# Patient Record
Sex: MALE | Race: WHITE | Employment: FULL TIME | URBAN - METROPOLITAN AREA
[De-identification: names, ages, dates, MRNs, and addresses within clinical notes are randomized per-mention and may not be internally consistent; named-entity substitution may affect disease eponyms.]

---

## 2019-06-12 ENCOUNTER — OFFICE VISIT (OUTPATIENT)
Dept: INTERNAL MEDICINE CLINIC | Facility: CLINIC | Age: 29
End: 2019-06-12
Payer: COMMERCIAL

## 2019-06-12 ENCOUNTER — HOSPITAL ENCOUNTER (OUTPATIENT)
Dept: GENERAL RADIOLOGY | Facility: HOSPITAL | Age: 29
Discharge: HOME | End: 2019-06-12
Attending: INTERNAL MEDICINE
Payer: COMMERCIAL

## 2019-06-12 VITALS
DIASTOLIC BLOOD PRESSURE: 81 MMHG | HEIGHT: 71 IN | HEART RATE: 78 BPM | BODY MASS INDEX: 31.36 KG/M2 | WEIGHT: 224 LBS | SYSTOLIC BLOOD PRESSURE: 138 MMHG | RESPIRATION RATE: 16 BRPM

## 2019-06-12 DIAGNOSIS — S99.911A INJURY OF RIGHT ANKLE, INITIAL ENCOUNTER: Primary | ICD-10-CM

## 2019-06-12 DIAGNOSIS — S99.911A INJURY OF RIGHT ANKLE, INITIAL ENCOUNTER: ICD-10-CM

## 2019-06-12 PROCEDURE — 99212 OFFICE O/P EST SF 10 MIN: CPT | Performed by: INTERNAL MEDICINE

## 2019-06-12 PROCEDURE — 73610 X-RAY EXAM OF ANKLE: CPT | Performed by: INTERNAL MEDICINE

## 2019-06-12 PROCEDURE — 99203 OFFICE O/P NEW LOW 30 MIN: CPT | Performed by: INTERNAL MEDICINE

## 2019-06-12 NOTE — PROGRESS NOTES
Kentrell Potter is a 25year old male. HPI:   1. Right ankle injury    3 weeks ago had baseball injury with slide into a base and twisted right ankle. Had swelling and bruising that developed the next day. Pain is better but swelling has persisted.  Pain Rest and ice joint and don't run until feeling better. The patient indicates understanding of these issues and agrees to the plan.   The patient is asked to return in 3 months

## 2019-06-26 ENCOUNTER — LAB ENCOUNTER (OUTPATIENT)
Dept: LAB | Age: 29
End: 2019-06-26
Attending: INTERNAL MEDICINE
Payer: COMMERCIAL

## 2019-06-26 ENCOUNTER — OFFICE VISIT (OUTPATIENT)
Dept: INTERNAL MEDICINE CLINIC | Facility: CLINIC | Age: 29
End: 2019-06-26
Payer: COMMERCIAL

## 2019-06-26 VITALS
RESPIRATION RATE: 16 BRPM | DIASTOLIC BLOOD PRESSURE: 81 MMHG | HEIGHT: 71 IN | BODY MASS INDEX: 30.94 KG/M2 | HEART RATE: 68 BPM | SYSTOLIC BLOOD PRESSURE: 134 MMHG | WEIGHT: 221 LBS

## 2019-06-26 DIAGNOSIS — Z00.00 PHYSICAL EXAM, ANNUAL: Primary | ICD-10-CM

## 2019-06-26 DIAGNOSIS — Z00.00 PHYSICAL EXAM, ANNUAL: ICD-10-CM

## 2019-06-26 PROCEDURE — 36415 COLL VENOUS BLD VENIPUNCTURE: CPT

## 2019-06-26 PROCEDURE — 99395 PREV VISIT EST AGE 18-39: CPT | Performed by: INTERNAL MEDICINE

## 2019-06-26 PROCEDURE — 80053 COMPREHEN METABOLIC PANEL: CPT

## 2019-06-26 PROCEDURE — 81003 URINALYSIS AUTO W/O SCOPE: CPT

## 2019-06-26 PROCEDURE — 84443 ASSAY THYROID STIM HORMONE: CPT

## 2019-06-26 PROCEDURE — 80061 LIPID PANEL: CPT

## 2019-06-26 PROCEDURE — 85025 COMPLETE CBC W/AUTO DIFF WBC: CPT

## 2019-06-26 NOTE — PROGRESS NOTES
Hannah Luna is a 25year old male who presents for a complete physical exam.   HPI:   Pt complains of nothing.       Immunization History  Administered            Date(s) Administered    DTP                   11/14/1988 02/06/1989 04/16/1989 exertion  CARDIOVASCULAR: denies chest pain on exertion  GI: denies abdominal pain,denies heartburn  : denies nocturia or changes in stream  MUSCULOSKELETAL: denies back pain  NEURO: denies headaches  PSYCHE: denies depression or anxiety  HEMATOLOGIC: de these issues and agrees to the plan.     The patient is asked to return for CPX in 1 year

## 2019-07-27 ENCOUNTER — HOSPITAL ENCOUNTER (OUTPATIENT)
Age: 29
Discharge: HOME | End: 2019-07-27
Payer: COMMERCIAL

## 2019-07-27 VITALS
WEIGHT: 220 LBS | DIASTOLIC BLOOD PRESSURE: 66 MMHG | RESPIRATION RATE: 20 BRPM | TEMPERATURE: 99 F | HEIGHT: 71 IN | SYSTOLIC BLOOD PRESSURE: 127 MMHG | OXYGEN SATURATION: 99 % | HEART RATE: 90 BPM | BODY MASS INDEX: 30.8 KG/M2

## 2019-07-27 DIAGNOSIS — L02.91 ABSCESS: Primary | ICD-10-CM

## 2019-07-27 PROCEDURE — 99204 OFFICE O/P NEW MOD 45 MIN: CPT

## 2019-07-27 PROCEDURE — 99213 OFFICE O/P EST LOW 20 MIN: CPT

## 2019-07-27 RX ORDER — CEPHALEXIN 500 MG/1
500 CAPSULE ORAL 4 TIMES DAILY
Qty: 40 CAPSULE | Refills: 0 | Status: ON HOLD | OUTPATIENT
Start: 2019-07-27 | End: 2019-08-01

## 2019-07-27 NOTE — ED PROVIDER NOTES
Patient presents with:  Swelling Edema (cardiovascular, metabolic)      HPI:     Paola Sage is a 25year old male with no significant past medical history presents with a chief complaint of swelling, pain and erythema to to medial aspect of the R thi primary care provider at scheduled appointment on Tuesday. No orders of the defined types were placed in this encounter. Labs performed this visit:  No results found for this or any previous visit (from the past 10 hour(s)).     Diagnosis:    ICD-10

## 2019-07-27 NOTE — ED INITIAL ASSESSMENT (HPI)
r upper thigh pain with swelling  noticed last week, denies fever,states he was playing softball, no direct trauma

## 2019-07-29 ENCOUNTER — OFFICE VISIT (OUTPATIENT)
Dept: INTERNAL MEDICINE CLINIC | Facility: CLINIC | Age: 29
End: 2019-07-29
Payer: COMMERCIAL

## 2019-07-29 ENCOUNTER — APPOINTMENT (OUTPATIENT)
Dept: CT IMAGING | Facility: HOSPITAL | Age: 29
DRG: 558 | End: 2019-07-29
Attending: EMERGENCY MEDICINE
Payer: COMMERCIAL

## 2019-07-29 ENCOUNTER — HOSPITAL ENCOUNTER (INPATIENT)
Facility: HOSPITAL | Age: 29
LOS: 2 days | Discharge: HOME | DRG: 558 | End: 2019-08-01
Attending: EMERGENCY MEDICINE | Admitting: HOSPITALIST
Payer: COMMERCIAL

## 2019-07-29 VITALS
TEMPERATURE: 99 F | HEIGHT: 71 IN | WEIGHT: 218 LBS | RESPIRATION RATE: 16 BRPM | SYSTOLIC BLOOD PRESSURE: 123 MMHG | DIASTOLIC BLOOD PRESSURE: 80 MMHG | HEART RATE: 94 BPM | BODY MASS INDEX: 30.52 KG/M2

## 2019-07-29 DIAGNOSIS — L03.115 CELLULITIS OF RIGHT THIGH: Primary | ICD-10-CM

## 2019-07-29 DIAGNOSIS — L02.415 ABSCESS OF RIGHT THIGH: Primary | ICD-10-CM

## 2019-07-29 DIAGNOSIS — L02.415 ABSCESS OF RIGHT THIGH: ICD-10-CM

## 2019-07-29 LAB
ANION GAP SERPL CALC-SCNC: 7 MMOL/L (ref 0–18)
BASOPHILS # BLD AUTO: 0.1 X10(3) UL (ref 0–0.2)
BASOPHILS NFR BLD AUTO: 0.6 %
BUN BLD-MCNC: 13 MG/DL (ref 7–18)
BUN/CREAT SERPL: 11.5 (ref 10–20)
CALCIUM BLD-MCNC: 9.9 MG/DL (ref 8.5–10.1)
CHLORIDE SERPL-SCNC: 99 MMOL/L (ref 98–112)
CO2 SERPL-SCNC: 29 MMOL/L (ref 21–32)
CREAT BLD-MCNC: 1.13 MG/DL (ref 0.7–1.3)
DEPRECATED RDW RBC AUTO: 35.6 FL (ref 35.1–46.3)
EOSINOPHIL # BLD AUTO: 0.26 X10(3) UL (ref 0–0.7)
EOSINOPHIL NFR BLD AUTO: 1.5 %
ERYTHROCYTE [DISTWIDTH] IN BLOOD BY AUTOMATED COUNT: 11.8 % (ref 11–15)
GLUCOSE BLD-MCNC: 87 MG/DL (ref 70–99)
HCT VFR BLD AUTO: 46 % (ref 39–53)
HGB BLD-MCNC: 15.6 G/DL (ref 13–17.5)
IMM GRANULOCYTES # BLD AUTO: 0.15 X10(3) UL (ref 0–1)
IMM GRANULOCYTES NFR BLD: 0.9 %
LACTATE SERPL-SCNC: 1.3 MMOL/L (ref 0.4–2)
LYMPHOCYTES # BLD AUTO: 2.64 X10(3) UL (ref 1–4)
LYMPHOCYTES NFR BLD AUTO: 15.5 %
MCH RBC QN AUTO: 28.6 PG (ref 26–34)
MCHC RBC AUTO-ENTMCNC: 33.9 G/DL (ref 31–37)
MCV RBC AUTO: 84.4 FL (ref 80–100)
MONOCYTES # BLD AUTO: 1.4 X10(3) UL (ref 0.1–1)
MONOCYTES NFR BLD AUTO: 8.2 %
NEUTROPHILS # BLD AUTO: 12.48 X10 (3) UL (ref 1.5–7.7)
NEUTROPHILS # BLD AUTO: 12.48 X10(3) UL (ref 1.5–7.7)
NEUTROPHILS NFR BLD AUTO: 73.3 %
OSMOLALITY SERPL CALC.SUM OF ELEC: 279 MOSM/KG (ref 275–295)
PLATELET # BLD AUTO: 429 10(3)UL (ref 150–450)
POTASSIUM SERPL-SCNC: 3.7 MMOL/L (ref 3.5–5.1)
RBC # BLD AUTO: 5.45 X10(6)UL (ref 4.3–5.7)
SODIUM SERPL-SCNC: 135 MMOL/L (ref 136–145)
WBC # BLD AUTO: 17 X10(3) UL (ref 4–11)

## 2019-07-29 PROCEDURE — 72193 CT PELVIS W/DYE: CPT | Performed by: EMERGENCY MEDICINE

## 2019-07-29 PROCEDURE — 99213 OFFICE O/P EST LOW 20 MIN: CPT | Performed by: INTERNAL MEDICINE

## 2019-07-29 PROCEDURE — 99222 1ST HOSP IP/OBS MODERATE 55: CPT | Performed by: HOSPITALIST

## 2019-07-29 PROCEDURE — 99212 OFFICE O/P EST SF 10 MIN: CPT | Performed by: INTERNAL MEDICINE

## 2019-07-29 PROCEDURE — 0Y9C0ZZ DRAINAGE OF RIGHT UPPER LEG, OPEN APPROACH: ICD-10-PCS | Performed by: EMERGENCY MEDICINE

## 2019-07-29 RX ORDER — LIDOCAINE HYDROCHLORIDE AND EPINEPHRINE 20; 5 MG/ML; UG/ML
20 INJECTION, SOLUTION EPIDURAL; INFILTRATION; INTRACAUDAL; PERINEURAL ONCE
Status: COMPLETED | OUTPATIENT
Start: 2019-07-29 | End: 2019-07-29

## 2019-07-29 RX ORDER — KETOROLAC TROMETHAMINE 30 MG/ML
30 INJECTION, SOLUTION INTRAMUSCULAR; INTRAVENOUS ONCE
Status: COMPLETED | OUTPATIENT
Start: 2019-07-29 | End: 2019-07-29

## 2019-07-29 NOTE — ED INITIAL ASSESSMENT (HPI)
Patient sent here by his doctor for an abscess on the right side of his groin. Was started on keflex Saturday and has not helped. Patient having fevers at home.

## 2019-07-30 PROBLEM — L02.415 ABSCESS OF RIGHT THIGH: Status: ACTIVE | Noted: 2019-07-30

## 2019-07-30 LAB
ALBUMIN SERPL-MCNC: 2.8 G/DL (ref 3.4–5)
ALBUMIN/GLOB SERPL: 0.7 {RATIO} (ref 1–2)
ALP LIVER SERPL-CCNC: 77 U/L (ref 45–117)
ALT SERPL-CCNC: 93 U/L (ref 16–61)
ANION GAP SERPL CALC-SCNC: 8 MMOL/L (ref 0–18)
AST SERPL-CCNC: 43 U/L (ref 15–37)
BASOPHILS # BLD AUTO: 0.08 X10(3) UL (ref 0–0.2)
BASOPHILS NFR BLD AUTO: 0.7 %
BILIRUB SERPL-MCNC: 0.5 MG/DL (ref 0.1–2)
BUN BLD-MCNC: 12 MG/DL (ref 7–18)
BUN/CREAT SERPL: 11.1 (ref 10–20)
CALCIUM BLD-MCNC: 8.3 MG/DL (ref 8.5–10.1)
CHLORIDE SERPL-SCNC: 109 MMOL/L (ref 98–112)
CO2 SERPL-SCNC: 25 MMOL/L (ref 21–32)
CREAT BLD-MCNC: 1.08 MG/DL (ref 0.7–1.3)
DEPRECATED RDW RBC AUTO: 36.2 FL (ref 35.1–46.3)
EOSINOPHIL # BLD AUTO: 0.26 X10(3) UL (ref 0–0.7)
EOSINOPHIL NFR BLD AUTO: 2.2 %
ERYTHROCYTE [DISTWIDTH] IN BLOOD BY AUTOMATED COUNT: 11.8 % (ref 11–15)
GLOBULIN PLAS-MCNC: 3.9 G/DL (ref 2.8–4.4)
GLUCOSE BLD-MCNC: 90 MG/DL (ref 70–99)
HCT VFR BLD AUTO: 38.4 % (ref 39–53)
HGB BLD-MCNC: 13 G/DL (ref 13–17.5)
IMM GRANULOCYTES # BLD AUTO: 0.14 X10(3) UL (ref 0–1)
IMM GRANULOCYTES NFR BLD: 1.2 %
LYMPHOCYTES # BLD AUTO: 2.6 X10(3) UL (ref 1–4)
LYMPHOCYTES NFR BLD AUTO: 21.8 %
M PROTEIN MFR SERPL ELPH: 6.7 G/DL (ref 6.4–8.2)
MCH RBC QN AUTO: 28.9 PG (ref 26–34)
MCHC RBC AUTO-ENTMCNC: 33.9 G/DL (ref 31–37)
MCV RBC AUTO: 85.3 FL (ref 80–100)
MONOCYTES # BLD AUTO: 1.35 X10(3) UL (ref 0.1–1)
MONOCYTES NFR BLD AUTO: 11.3 %
NEUTROPHILS # BLD AUTO: 7.47 X10 (3) UL (ref 1.5–7.7)
NEUTROPHILS # BLD AUTO: 7.47 X10(3) UL (ref 1.5–7.7)
NEUTROPHILS NFR BLD AUTO: 62.8 %
OSMOLALITY SERPL CALC.SUM OF ELEC: 293 MOSM/KG (ref 275–295)
PLATELET # BLD AUTO: 328 10(3)UL (ref 150–450)
POTASSIUM SERPL-SCNC: 4.4 MMOL/L (ref 3.5–5.1)
RBC # BLD AUTO: 4.5 X10(6)UL (ref 4.3–5.7)
SODIUM SERPL-SCNC: 142 MMOL/L (ref 136–145)
WBC # BLD AUTO: 11.9 X10(3) UL (ref 4–11)

## 2019-07-30 PROCEDURE — 99223 1ST HOSP IP/OBS HIGH 75: CPT | Performed by: SURGERY

## 2019-07-30 PROCEDURE — 99233 SBSQ HOSP IP/OBS HIGH 50: CPT | Performed by: HOSPITALIST

## 2019-07-30 RX ORDER — HEPARIN SODIUM 5000 [USP'U]/ML
5000 INJECTION, SOLUTION INTRAVENOUS; SUBCUTANEOUS ONCE
Status: COMPLETED | OUTPATIENT
Start: 2019-07-30 | End: 2019-07-30

## 2019-07-30 RX ORDER — ONDANSETRON 2 MG/ML
4 INJECTION INTRAMUSCULAR; INTRAVENOUS EVERY 6 HOURS PRN
Status: DISCONTINUED | OUTPATIENT
Start: 2019-07-30 | End: 2019-08-01

## 2019-07-30 RX ORDER — SODIUM CHLORIDE 9 MG/ML
INJECTION, SOLUTION INTRAVENOUS CONTINUOUS
Status: DISCONTINUED | OUTPATIENT
Start: 2019-07-30 | End: 2019-07-31

## 2019-07-30 RX ORDER — SODIUM CHLORIDE 9 MG/ML
INJECTION, SOLUTION INTRAVENOUS CONTINUOUS
Status: ACTIVE | OUTPATIENT
Start: 2019-07-30 | End: 2019-07-30

## 2019-07-30 RX ORDER — SODIUM CHLORIDE 0.9 % (FLUSH) 0.9 %
3 SYRINGE (ML) INJECTION AS NEEDED
Status: DISCONTINUED | OUTPATIENT
Start: 2019-07-30 | End: 2019-08-01

## 2019-07-30 RX ORDER — HYDROMORPHONE HYDROCHLORIDE 1 MG/ML
0.5 INJECTION, SOLUTION INTRAMUSCULAR; INTRAVENOUS; SUBCUTANEOUS
Status: DISCONTINUED | OUTPATIENT
Start: 2019-07-30 | End: 2019-07-31

## 2019-07-30 RX ORDER — HYDROMORPHONE HYDROCHLORIDE 1 MG/ML
0.3 INJECTION, SOLUTION INTRAMUSCULAR; INTRAVENOUS; SUBCUTANEOUS
Status: DISCONTINUED | OUTPATIENT
Start: 2019-07-30 | End: 2019-07-31

## 2019-07-30 NOTE — PAYOR COMM NOTE
--------------  ADMISSION REVIEW     Payor: Margie Walls [facility] #:  [id number]  Authorization Number: [id number]    ED Provider Notes      Patient Seen in: [facility] Emergency Department    History   Patient presents wit components:    WBC 17.0 (*)     Neutrophil Absolute Prelim 12.48 (*)     Neutrophil Absolute 12.48 (*)     Monocyte Absolute 1.40 (*)     All other components within normal limits   LACTIC ACID, PLASMA - Normal   CBC WITH DIFFERENTIAL WITH PLATELET    Narr primary care physician.   He had an appointment with Dr. Clayton Cagle scheduled for the day of admission, and when he was evaluated by Dr. Jennifer Doty, the area had not improved, and Dr. Jennifer Doty advised that the patient present to the emergency room for further has been a line drawn around the cellulitic area which seemed to already be receding from the line slightly, at the time of my exam.  There was no redness, induration, or pain posteriorly on the thigh and not extended into the scrotal or perineal area.   Pe Arm) Jocelyne Abreu, RN      HYDROmorphone HCl (DILAUDID) 1 MG/ML injection 0.3 mg     Date Action Dose Route User    7/30/2019 0534 Given 0.3 mg Intravenous Yuly Villa RN      iopamidol (ISOVUE-M) 76 % injection 100 mL     Date Action Dose Route and abscess of right thigh  - s/p I and D in the ER  - Area of cellulitis has been demarcated  - surgery on board, appreciate recs  - continue broad spectrum IV abx, at this time.    - will continue to monitor.      Mild hyponatremia  - improved, continue i

## 2019-07-30 NOTE — PROGRESS NOTES
[facility] Dosing Service    Initial Pharmacokinetic Consult for Vancomycin Dosing     Koki Vegas is a 25year old male who is being treated for cellulitis. Pharmacy has been asked to dose Vancomycin by Dr. Padilla Horse    He has No Known Allergies.

## 2019-07-30 NOTE — PLAN OF CARE
Problem: Patient/Family Goals  Goal: Patient/Family Long Term Goal  Description  Patient's Long Term Goal:     Interventions:  -   - See additional Care Plan goals for specific interventions  Outcome: Progressing  Goal: Patient/Family Short Term Goal  Gerline Derek w/pt and caregiver  - Include patient/family/discharge partner in discharge planning  - Arrange for needed discharge resources and transportation as appropriate  - Identify discharge learning needs (meds, wound care, etc)  - Arrange for interpreters to ass

## 2019-07-30 NOTE — H&P
[facility]    PATIENT'S NAME: Inga Vang   ATTENDING PHYSICIAN: Estrella Toledo MD   PATIENT ACCOUNT#:   [id number]    LOCATION:  [address] [place] [address] #:   [id number]       YOB: 1990  ADMISSION DATE: evaluation and monitoring. PAST MEDICAL HISTORY:  The patient denies any chronic medical problems such as diabetes, high blood pressure, asthma, or heart problems. MEDICATIONS:  Prior to admission, Keflex and ibuprofen.     ALLERGIES:  No known drug a and cooperative. BACK:  There was no costovertebral angle tenderness. LABORATORY DATA:  Glucose 87, sodium 135, potassium 3.7, chloride 99, CO2 of 29, BUN of 13, with a creatinine of 1.3, calcium 9.9, anion gap of 7, lactic acid level 1.3.   White count

## 2019-07-30 NOTE — ED NOTES
Pt presents to ED for left sided groin abscess that started 10 days ago. Pt states he was seen at urgent care Saturday and prescribed keflex. Pt states he has been taking the antibiotic since Saturday morning and continues to have fevers.  Pt states yesterd

## 2019-07-30 NOTE — CONSULTS
Deep subq/muscular abscess of R leg - seems adequately drained by ED I&D. CX-directed IV then po abx, change packing in 1-2 days, close obs.

## 2019-07-30 NOTE — PLAN OF CARE
No complaints of pain throughout the shift. Pt on general diet per surgery. Dressing changed per orders. Redness is decreasing. IV vanco and zosyn continued.      Problem: PAIN - ADULT  Goal: Verbalizes/displays adequate comfort level or patient's with appropriate resources  Description  INTERVENTIONS:  - Identify barriers to discharge w/pt and caregiver  - Include patient/family/discharge partner in discharge planning  - Arrange for needed discharge resources and transportation as appropriate  - Id

## 2019-07-30 NOTE — PROGRESS NOTES
[facility]    Progress Note    Merline Ana Patient Status:  Inpatient    10/1/1990 MRN [id number]   Location [facility] 4W/SW/SE Attending Aleksandra Vela MD   Hosp Day # 0 PCP Ace Herring MD        Subjective: CREATSERUM 1.08 07/30/2019    BUN 12 07/30/2019     07/30/2019    K 4.4 07/30/2019     07/30/2019    CO2 25.0 07/30/2019    GLU 90 07/30/2019    CA 8.3 (L) 07/30/2019    ALB 2.8 (L) 07/30/2019    ALKPHO 77 07/30/2019    BILT 0.5 07/30/2019    T

## 2019-07-30 NOTE — ED PROVIDER NOTES
Patient Seen in: [facility] AND [facility] Emergency Department    History   Patient presents with:  Abscess (integumentary)    Stated Complaint: abscess in groin, sent by MD      HPI    24 yo M without PMH presenting for evaluation of medial thigh redness and swe conversational.  HEENT: MMM. Head: Normocephalic. Eyes: No injection. Cardiovascular: RRR. BLE with intact pedal pulses. Pulmonary/Chest: Effort normal. CTAB. Abdominal: Soft. Nontender. Musculoskeletal: No gross deformity.  Right medial thigh with purulent drainage was expressed. 1 inch packing placed thereafter. The patient tolerated the procedure well. The procedure was performed by myself.       [facility]      Sepsis Reassessment Note    /71   Pulse 100   Temp 98.7 °F (37.1

## 2019-07-30 NOTE — PROGRESS NOTES
ADMISSION NOTE    25year old male with no significant PMH presents with swelling induration R medial upper thigh without antecedent trauma. Started on keflex on sat with no improvement  CT revealed sartorius muscle abscess.  . Available medical records pa

## 2019-07-31 LAB
ANION GAP SERPL CALC-SCNC: 5 MMOL/L (ref 0–18)
BASOPHILS # BLD AUTO: 0.09 X10(3) UL (ref 0–0.2)
BASOPHILS NFR BLD AUTO: 0.9 %
BUN BLD-MCNC: 9 MG/DL (ref 7–18)
BUN/CREAT SERPL: 8.8 (ref 10–20)
CALCIUM BLD-MCNC: 8.8 MG/DL (ref 8.5–10.1)
CHLORIDE SERPL-SCNC: 110 MMOL/L (ref 98–112)
CO2 SERPL-SCNC: 27 MMOL/L (ref 21–32)
CREAT BLD-MCNC: 1.02 MG/DL (ref 0.7–1.3)
DEPRECATED RDW RBC AUTO: 37.7 FL (ref 35.1–46.3)
EOSINOPHIL # BLD AUTO: 0.32 X10(3) UL (ref 0–0.7)
EOSINOPHIL NFR BLD AUTO: 3.3 %
ERYTHROCYTE [DISTWIDTH] IN BLOOD BY AUTOMATED COUNT: 12 % (ref 11–15)
GLUCOSE BLD-MCNC: 84 MG/DL (ref 70–99)
HAV IGM SER QL: 2.2 MG/DL (ref 1.6–2.6)
HCT VFR BLD AUTO: 40.6 % (ref 39–53)
HGB BLD-MCNC: 13.5 G/DL (ref 13–17.5)
IMM GRANULOCYTES # BLD AUTO: 0.17 X10(3) UL (ref 0–1)
IMM GRANULOCYTES NFR BLD: 1.7 %
LYMPHOCYTES # BLD AUTO: 2.32 X10(3) UL (ref 1–4)
LYMPHOCYTES NFR BLD AUTO: 23.8 %
MCH RBC QN AUTO: 28.8 PG (ref 26–34)
MCHC RBC AUTO-ENTMCNC: 33.3 G/DL (ref 31–37)
MCV RBC AUTO: 86.6 FL (ref 80–100)
MONOCYTES # BLD AUTO: 0.86 X10(3) UL (ref 0.1–1)
MONOCYTES NFR BLD AUTO: 8.8 %
NEUTROPHILS # BLD AUTO: 5.98 X10 (3) UL (ref 1.5–7.7)
NEUTROPHILS # BLD AUTO: 5.98 X10(3) UL (ref 1.5–7.7)
NEUTROPHILS NFR BLD AUTO: 61.5 %
OSMOLALITY SERPL CALC.SUM OF ELEC: 292 MOSM/KG (ref 275–295)
PHOSPHATE SERPL-MCNC: 3.1 MG/DL (ref 2.5–4.9)
PLATELET # BLD AUTO: 329 10(3)UL (ref 150–450)
POTASSIUM SERPL-SCNC: 4.5 MMOL/L (ref 3.5–5.1)
RBC # BLD AUTO: 4.69 X10(6)UL (ref 4.3–5.7)
SODIUM SERPL-SCNC: 142 MMOL/L (ref 136–145)
VANCOMYCIN TROUGH SERPL-MCNC: 8.2 UG/ML (ref 10–20)
WBC # BLD AUTO: 9.7 X10(3) UL (ref 4–11)

## 2019-07-31 PROCEDURE — 99233 SBSQ HOSP IP/OBS HIGH 50: CPT | Performed by: HOSPITALIST

## 2019-07-31 PROCEDURE — 99231 SBSQ HOSP IP/OBS SF/LOW 25: CPT | Performed by: SURGERY

## 2019-07-31 RX ORDER — HYDROCODONE BITARTRATE AND ACETAMINOPHEN 5; 325 MG/1; MG/1
1 TABLET ORAL EVERY 4 HOURS PRN
Status: DISCONTINUED | OUTPATIENT
Start: 2019-07-31 | End: 2019-08-01

## 2019-07-31 NOTE — PROGRESS NOTES
[facility]    Progress Note    Anais Moody Patient Status:  Inpatient    10/1/1990 MRN [id number]   Location [facility] 4W/SW/SE Attending Ayaz Doll MD   Hosp Day # 1 PCP Iglesia Person MD     Assessment and Plan: 06/26/2019    MG 2.2 07/31/2019    PHOS 3.1 07/31/2019       Ct Pelvis(contrast Only) (cpt=72193)    Result Date: 7/30/2019  CONCLUSION:  1. There is a fluid collection, suspicious for abscess, predominantly involving the right sartorius muscle.   2. Nonspe

## 2019-07-31 NOTE — PLAN OF CARE
Patient denies pain. Patient has been ambulating in parker. Right groin dressing changed today. Packing still in place. IV Zosyn and Vanco continued. Redness on groin is decreasing. Will continue to monitor patient.        Problem: Patient/Family Goals  Goal strengthening/mobility  - Encourage toileting schedule  Outcome: Progressing     Problem: DISCHARGE PLANNING  Goal: Discharge to home or other facility with appropriate resources  Description  INTERVENTIONS:  - Identify barriers to discharge w/pt and careg

## 2019-07-31 NOTE — PROGRESS NOTES
[facility]  Hospitalist Progress Note     Lalit Saleh Patient Status:  Inpatient      25year old [facility] [id number]   Location 460/460-A Attending Stevie Puri MD   Hosp Day # 1 PCP Adrián Mcfarlane MD     ASSESSMENT/PLAN 135* 142 142   K 3.7 4.4 4.5   CL 99 109 110   CO2 29.0 25.0 27.0   ALKPHO  --  77  --    AST  --  43*  --    ALT  --  93*  --    BILT  --  0.5  --    TP  --  6.7  --      No results for input(s): PT, INR, PTT in the last 168 hours.     • piperacillin-tazob

## 2019-07-31 NOTE — CONSULTS
[facility]    PATIENT'S NAME: NICOLASA MEJIA   ATTENDING PHYSICIAN: Jamir Gonzalez MD   CONSULTING PHYSICIAN: Suleman Rene MD   PATIENT ACCOUNT#:   [id number]    LOCATION:  [address] [place] [address] #:   [id number]       DATE OF BIRTH: edema.  NEUROLOGIC:  Grossly intact. SKIN:  A small 1 cm opening or ulcer in the proximal anterior right thigh with seropurulent drainage. There is surrounding redness and induration, but no obvious fluctuance. He is mildly tender.   Distal neurovascular

## 2019-07-31 NOTE — PLAN OF CARE
Discussed plan of care for day, including all given medications and their indications. IV antibiotics (Vancomycin and Zosyn) continued for one more day. Dr Hailee Buchanan to replace packing to right thigh tomorrow morning.   Plan for discharge tomorrow with trans or patient reports new pain  - Anticipate increased pain with activity and pre-medicate as appropriate  7/31/2019 1612 by Yeu Hadley RN  Outcome: Progressing  7/31/2019 1612 by Yue Hadley RN  Outcome: Progressing  7/31/2019 1609 by Bianka Guzman support system  7/31/2019 1612 by Elke King RN  Outcome: Progressing  7/31/2019 1612 by Elke King RN  Outcome: Progressing  7/31/2019 1609 by Elke King RN  Outcome: Progressing     Problem: Patient Centered Care  Goal: Patient pr

## 2019-08-01 VITALS
SYSTOLIC BLOOD PRESSURE: 114 MMHG | HEART RATE: 78 BPM | TEMPERATURE: 98 F | OXYGEN SATURATION: 99 % | DIASTOLIC BLOOD PRESSURE: 61 MMHG | WEIGHT: 222.5 LBS | BODY MASS INDEX: 31 KG/M2 | RESPIRATION RATE: 16 BRPM

## 2019-08-01 PROCEDURE — 99231 SBSQ HOSP IP/OBS SF/LOW 25: CPT | Performed by: SURGERY

## 2019-08-01 PROCEDURE — 99239 HOSP IP/OBS DSCHRG MGMT >30: CPT | Performed by: HOSPITALIST

## 2019-08-01 RX ORDER — AMOXICILLIN 500 MG/1
500 TABLET, FILM COATED ORAL 3 TIMES DAILY
Qty: 24 TABLET | Refills: 0 | Status: SHIPPED | OUTPATIENT
Start: 2019-08-01 | End: 2019-08-09

## 2019-08-01 NOTE — PROGRESS NOTES
[facility] dosing service    Follow-up Pharmacokinetic Consult for Vancomycin Dosing     Claudine Gomez is a 25year old male who is being treated for Right thigh abscess with cellulitis .    Patient is on day 2 of Vancomycin and add is currently receiv function. 4.  Pharmacy will follow and adjust as necessary. We appreciate the opportunity to assist in his care.     Jessica Peña [facility]  7/31/2019  10:34 AM  [address]: [phone]

## 2019-08-01 NOTE — PLAN OF CARE
Problem: Patient/Family Goals  Goal: Patient/Family Long Term Goal  Description  Patient's Long Term Goal: Discharge home    Interventions:  - Transition from IV to oral antibiotics  - Maintain dressing care  - Follow up with MD's as instructed  - See ad strengthening/mobility  - Encourage toileting schedule  Outcome: Progressing     Problem: DISCHARGE PLANNING  Goal: Discharge to home or other facility with appropriate resources  Description  INTERVENTIONS:  - Identify barriers to discharge w/pt and careg afebrile. Top dressing on right thigh was changed as needed, with iodoform kept in place. Continue to monitor.

## 2019-08-01 NOTE — DISCHARGE SUMMARY
[facility] HOSPITALIST  DISCHARGE SUMMARY     Kwame [place] Patient Status:  Inpatient    10/1/1990 MRN [id number]   Location [facility] 4W/SW/SE Attending Brandi Correa MD   Hosp Day # 2 PCP Rosi Zaldivar MD     DATE OF ADMISSION: / surgical floor for further evaluation and monitoring. HOSPITAL COURSE:  Patient had incision and drainage in the ER. He did well after this. The wound was packed. He was seen by general surgery but no further intervention was needed.   Cultures grew o Caps  Commonly known as:  [place]              Where to Get Your Medications      Please  your prescriptions at the location directed by your doctor or nurse    Bring a paper prescription for each of these medications  · amoxicillin 500 MG Tabs

## 2019-08-02 ENCOUNTER — PATIENT OUTREACH (OUTPATIENT)
Dept: CASE MANAGEMENT | Age: 29
End: 2019-08-02

## 2019-08-02 DIAGNOSIS — L02.415 ABSCESS OF RIGHT THIGH: ICD-10-CM

## 2019-08-02 DIAGNOSIS — Z02.9 ENCOUNTERS FOR ADMINISTRATIVE PURPOSE: ICD-10-CM

## 2019-08-02 PROCEDURE — 1111F DSCHRG MED/CURRENT MED MERGE: CPT

## 2019-08-02 NOTE — PROGRESS NOTES
Una Thomason [phone] for post hospital follow up, [facility] contact information provided.

## 2019-08-05 NOTE — PROGRESS NOTES
Initial Post Discharge Follow Up   Discharge Date: 8/1/19  Contact Date: 8/2/2019    Consent Verification:  Assessment Completed With: Patient  HIPAA Verified?   Yes    Discharge Dx:   Cellulitis of right thigh    Abscess of right thigh    General:   • Ho Needs post D/C:   Now that you are home, are there any needs or concerns you need addressed before your next visit with your PCP?  (DME, meds, disease concerns, Etc): No     Follow up appointments:           PCP TCM/HFU appointment: scheduled at D/C with

## 2019-08-06 NOTE — PROGRESS NOTES
Nevaeh Weaver is a 25year old male. HPI:     1. Abscess of right thigh    Has developed a swelling in his right thigh the last week. Went to urgent care and was placed on oral antibiotics. The swelling and pain has not improved much.    Has some fever to return as needed.

## 2019-08-07 ENCOUNTER — OFFICE VISIT (OUTPATIENT)
Dept: INTERNAL MEDICINE CLINIC | Facility: CLINIC | Age: 29
End: 2019-08-07
Payer: COMMERCIAL

## 2019-08-07 VITALS
BODY MASS INDEX: 30.1 KG/M2 | HEIGHT: 71 IN | WEIGHT: 215 LBS | RESPIRATION RATE: 16 BRPM | HEART RATE: 84 BPM | DIASTOLIC BLOOD PRESSURE: 81 MMHG | SYSTOLIC BLOOD PRESSURE: 130 MMHG

## 2019-08-07 DIAGNOSIS — L02.415 ABSCESS OF RIGHT THIGH: Primary | ICD-10-CM

## 2019-08-07 PROCEDURE — 1111F DSCHRG MED/CURRENT MED MERGE: CPT | Performed by: INTERNAL MEDICINE

## 2019-08-07 PROCEDURE — 99495 TRANSJ CARE MGMT MOD F2F 14D: CPT | Performed by: INTERNAL MEDICINE

## 2019-08-07 NOTE — PROGRESS NOTES
HPI:    Vandana Vaz is a 25year old male here today for hospital follow up.    He was discharged from Inpatient hospital, [facility] AND [facility]  to Home   Admission Date: 7/29/19   Discharge Date: 8/1/19  Hospital Discharge Diagnoses (since 7/8/2019) was found to have a white blood cell count of 17,000 with a hemoglobin of 15 and a platelet count of 262,241.  There were 73% neutrophils.  Blood cultures were obtained in the emergency room.  Lactic acid level was 1.3.  BMP was essentially normal except f denies thyroid history  ALL/ASTHMA: denies hx of allergy or asthma    PHYSICAL EXAM:   No LMP for male patient. Estimated body mass index is 29.99 kg/m² as calculated from the following:    Height as of this encounter: 5' 11\" (1.803 m).     Weight as of t Complications, Morbidity, and/or Mortality: moderate    Overall Risk:     moderate    Patient seen within 7 days from date of discharge.      Glory Juan MD, 8/7/2019

## 2019-08-09 ENCOUNTER — OFFICE VISIT (OUTPATIENT)
Dept: SURGERY | Facility: CLINIC | Age: 29
End: 2019-08-09
Payer: COMMERCIAL

## 2019-08-09 VITALS — WEIGHT: 215 LBS | BODY MASS INDEX: 30 KG/M2

## 2019-08-09 DIAGNOSIS — L02.415 ABSCESS OF RIGHT THIGH: Primary | ICD-10-CM

## 2019-08-09 PROCEDURE — 99213 OFFICE O/P EST LOW 20 MIN: CPT | Performed by: SURGERY

## 2019-08-09 PROCEDURE — 99212 OFFICE O/P EST SF 10 MIN: CPT | Performed by: SURGERY

## 2019-08-09 NOTE — PROGRESS NOTES
Established Patient Follow-up      8/9/2019    [facility] 25year old      HPI  Patient presents with: Follow - Up: F/U from hospital stay 7/30/2019 for abscess of the right lower leg in the groin area. Patient has dressing with packing.   Denies pa

## 2019-08-16 ENCOUNTER — OFFICE VISIT (OUTPATIENT)
Dept: SURGERY | Facility: CLINIC | Age: 29
End: 2019-08-16
Payer: COMMERCIAL

## 2019-08-16 VITALS — WEIGHT: 215 LBS | BODY MASS INDEX: 30 KG/M2

## 2019-08-16 DIAGNOSIS — L02.415 ABSCESS OF RIGHT THIGH: Primary | ICD-10-CM

## 2019-08-16 PROCEDURE — 99213 OFFICE O/P EST LOW 20 MIN: CPT | Performed by: SURGERY

## 2019-08-16 PROCEDURE — 99212 OFFICE O/P EST SF 10 MIN: CPT | Performed by: SURGERY

## 2019-08-16 NOTE — PROGRESS NOTES
Established Patient Follow-up      8/16/2019    Wale Gordon 25year old      HPI  Patient presents with:  Wound: Patient here for follow up on wound care. States he feels fine, no pain, states the drainage is decreasing. Denies fevers.     Some skin

## 2019-08-23 NOTE — CDS QUERY
Clarification of I&D  [place]    Dear Doctor:  Clinical information (provided below) indicates an Incision & Drainage (I&D) was done.  For accurate ICD-10-PCS code assignment to reflect severity of illness and risk of morta TOP OF THIS NOTE, CLICK EDIT, PUT AN \"X\" IN BRACKET FOR THE DIAGNOSIS THAT APPLIES, AND THEN SIGN. If you have any questions, please contact Clinical :  Gillian Rollins RN at [phone]      Thank You!     THIS FORM IS A PERMANENT P

## 2019-09-06 ENCOUNTER — OFFICE VISIT (OUTPATIENT)
Dept: SURGERY | Facility: CLINIC | Age: 29
End: 2019-09-06
Payer: COMMERCIAL

## 2019-09-06 VITALS — BODY MASS INDEX: 30 KG/M2 | WEIGHT: 215 LBS

## 2019-09-06 DIAGNOSIS — L02.415 ABSCESS OF RIGHT THIGH: Primary | ICD-10-CM

## 2019-09-06 PROCEDURE — 99213 OFFICE O/P EST LOW 20 MIN: CPT | Performed by: SURGERY

## 2019-09-06 PROCEDURE — 99212 OFFICE O/P EST SF 10 MIN: CPT | Performed by: SURGERY

## 2019-09-06 NOTE — PROGRESS NOTES
Established Patient Follow-up      9/6/2019    Kelechi Thapa 25year old      HPI  Patient presents with: Follow - Up: Follow up on wound right thigh.   Patient states he removed packing 2 weeks ago, with minimal drainage, and as of one week ago, there

## 2021-04-15 ENCOUNTER — IMMUNIZATION (OUTPATIENT)
Dept: LAB | Age: 31
End: 2021-04-15
Attending: HOSPITALIST
Payer: COMMERCIAL

## 2021-04-15 DIAGNOSIS — Z23 NEED FOR VACCINATION: Primary | ICD-10-CM

## 2021-04-15 PROCEDURE — 0001A SARSCOV2 VAC 30MCG/0.3ML IM: CPT

## 2021-05-06 ENCOUNTER — IMMUNIZATION (OUTPATIENT)
Dept: LAB | Age: 31
End: 2021-05-06
Attending: HOSPITALIST
Payer: COMMERCIAL

## 2021-05-06 DIAGNOSIS — Z23 NEED FOR VACCINATION: Primary | ICD-10-CM

## 2021-05-06 PROCEDURE — 0002A SARSCOV2 VAC 30MCG/0.3ML IM: CPT

## 2021-06-16 ENCOUNTER — OFFICE VISIT (OUTPATIENT)
Dept: INTERNAL MEDICINE CLINIC | Facility: CLINIC | Age: 31
End: 2021-06-16
Payer: COMMERCIAL

## 2021-06-16 ENCOUNTER — LAB ENCOUNTER (OUTPATIENT)
Dept: LAB | Age: 31
End: 2021-06-16
Attending: INTERNAL MEDICINE
Payer: COMMERCIAL

## 2021-06-16 VITALS
HEART RATE: 73 BPM | DIASTOLIC BLOOD PRESSURE: 73 MMHG | BODY MASS INDEX: 32.34 KG/M2 | HEIGHT: 71 IN | RESPIRATION RATE: 16 BRPM | WEIGHT: 231 LBS | SYSTOLIC BLOOD PRESSURE: 115 MMHG

## 2021-06-16 DIAGNOSIS — Z00.00 PHYSICAL EXAM, ANNUAL: Primary | ICD-10-CM

## 2021-06-16 PROCEDURE — 84443 ASSAY THYROID STIM HORMONE: CPT | Performed by: INTERNAL MEDICINE

## 2021-06-16 PROCEDURE — 36415 COLL VENOUS BLD VENIPUNCTURE: CPT | Performed by: INTERNAL MEDICINE

## 2021-06-16 PROCEDURE — 85025 COMPLETE CBC W/AUTO DIFF WBC: CPT | Performed by: INTERNAL MEDICINE

## 2021-06-16 PROCEDURE — 3078F DIAST BP <80 MM HG: CPT | Performed by: INTERNAL MEDICINE

## 2021-06-16 PROCEDURE — 80053 COMPREHEN METABOLIC PANEL: CPT | Performed by: INTERNAL MEDICINE

## 2021-06-16 PROCEDURE — 3074F SYST BP LT 130 MM HG: CPT | Performed by: INTERNAL MEDICINE

## 2021-06-16 PROCEDURE — 99395 PREV VISIT EST AGE 18-39: CPT | Performed by: INTERNAL MEDICINE

## 2021-06-16 PROCEDURE — 80061 LIPID PANEL: CPT | Performed by: INTERNAL MEDICINE

## 2021-06-16 PROCEDURE — 3008F BODY MASS INDEX DOCD: CPT | Performed by: INTERNAL MEDICINE

## 2021-06-16 PROCEDURE — 81003 URINALYSIS AUTO W/O SCOPE: CPT | Performed by: INTERNAL MEDICINE

## 2021-06-16 NOTE — PROGRESS NOTES
Jeffrey Head is a 26year old male who presents for a complete physical exam.   HPI:   Pt complains of nothing.       Immunization History  Administered            Date(s) Administered    [employer] 30 mcg/0.3 ml                          04 behind eye for broken orbtal bone      Family History   Problem Relation Age of Onset   • Hypertension Father       Social History:  Social History    Tobacco Use      Smoking status: Former Smoker      Smokeless tobacco: Never Used    Vaping Use      Vapi PLAN:   1. Physical exam, annual    Kush Maciel is a 26year old male who presents for a complete physical exam. Pt's weight is Body mass index is 30.82 kg/m². , recommended low fat diet and aerobic exercise 30 minutes three times weekly.  [facility]

## 2021-12-02 ENCOUNTER — IMMUNIZATION (OUTPATIENT)
Dept: LAB | Facility: HOSPITAL | Age: 31
End: 2021-12-02
Attending: EMERGENCY MEDICINE
Payer: COMMERCIAL

## 2021-12-02 DIAGNOSIS — Z23 NEED FOR VACCINATION: Primary | ICD-10-CM

## 2021-12-02 PROCEDURE — 0004A SARSCOV2 VAC 30MCG/0.3ML IM: CPT

## 2022-01-31 ENCOUNTER — LAB ENCOUNTER (OUTPATIENT)
Dept: LAB | Age: 32
End: 2022-01-31
Attending: NURSE PRACTITIONER
Payer: COMMERCIAL

## 2022-01-31 ENCOUNTER — TELEMEDICINE (OUTPATIENT)
Dept: TELEHEALTH | Age: 32
End: 2022-01-31

## 2022-01-31 DIAGNOSIS — Z20.822 ENCOUNTER BY TELEHEALTH FOR SUSPECTED COVID-19: ICD-10-CM

## 2022-01-31 DIAGNOSIS — Z20.822 ENCOUNTER BY TELEHEALTH FOR SUSPECTED COVID-19: Primary | ICD-10-CM

## 2022-01-31 PROCEDURE — 99212 OFFICE O/P EST SF 10 MIN: CPT | Performed by: NURSE PRACTITIONER

## 2022-01-31 NOTE — PROGRESS NOTES
This is a telemedicine visit with live, interactive video and audio. Patient understands and accepts financial responsibility for any deductible, co-insurance and/or co-pays associated with this service. SUBJECTIVE  \"I have congestion and headache. cause mild to severe symptoms. Please read your discharge summary regarding any medication prescribed and more information regarding Covid. Your test results may take 2-3 days to result. Please monitor your mychart for results.      Please call our c possible. We continue recommend that you continue to wear a mask indoors and when unable to socially distance more than 6 feet regardless of vaccination status.     --------Management  Get plenty of rest, drink fluids, cover your mouth if coughing. for further evaluation and treatment    Please read your discharge summary for additional information and visit the CDC website on COVID-19.     If you had no symptoms at the time of testing and then develop new symptoms, you may need to be re-tested (parti

## 2022-02-01 LAB — SARS-COV-2 RNA RESP QL NAA+PROBE: NOT DETECTED

## 2022-07-15 NOTE — PROGRESS NOTES
[facility]    Progress Note    Brandie Polo Patient Status:  Inpatient    10/1/1990 MRN [id number]   Location [facility] 4W/SW/SE Attending Alissa Burnham MD   Hosp Day # 2 PCP Kendra Cabrera MD     Assessment and Plan: Patient discharged. Iv taken out. patient agreeable to discharge instructions and follow ups. Medication scripts given as well and stent brochure and vascular closure device brochure. Daughter in the room, will be driving patient. 07/31/2019                     Juan Galvin MD  8/1/2019

## 2022-09-09 ENCOUNTER — OFFICE VISIT (OUTPATIENT)
Dept: INTERNAL MEDICINE CLINIC | Facility: CLINIC | Age: 32
End: 2022-09-09
Payer: COMMERCIAL

## 2022-09-09 VITALS
HEART RATE: 78 BPM | HEIGHT: 71 IN | WEIGHT: 229.19 LBS | BODY MASS INDEX: 32.09 KG/M2 | SYSTOLIC BLOOD PRESSURE: 129 MMHG | DIASTOLIC BLOOD PRESSURE: 86 MMHG

## 2022-09-09 DIAGNOSIS — R22.1 MASS OF RIGHT SIDE OF NECK: Primary | ICD-10-CM

## 2022-09-09 PROCEDURE — 3079F DIAST BP 80-89 MM HG: CPT | Performed by: INTERNAL MEDICINE

## 2022-09-09 PROCEDURE — 99213 OFFICE O/P EST LOW 20 MIN: CPT | Performed by: INTERNAL MEDICINE

## 2022-09-09 PROCEDURE — 3074F SYST BP LT 130 MM HG: CPT | Performed by: INTERNAL MEDICINE

## 2022-09-09 PROCEDURE — 3008F BODY MASS INDEX DOCD: CPT | Performed by: INTERNAL MEDICINE

## 2022-09-12 ENCOUNTER — TELEPHONE (OUTPATIENT)
Dept: ADMINISTRATIVE | Age: 32
End: 2022-09-12

## 2022-09-12 NOTE — TELEPHONE ENCOUNTER
Good afternoon,    Please be advised:  A peer to peer is being requested by [employer]. Based on the clinical information provided, the request has not demonstrated consistency with evidence-based clinical guidelines. A Physician-to-Physician discussion is required to complete the Notification Process. The ordering provider must call [phone] and select option #3 to engage in a Physician-to-Physician discussion. Case: [id number]      Please advise on status. [address]   Authorization/Referral Specialist  Gabe/Gayle

## 2022-09-12 NOTE — TELEPHONE ENCOUNTER
I spoke just now with Wayne. No peer to peer needed. Study has been authorized.   Authorization number is [id number], valid until October 27

## 2022-09-15 ENCOUNTER — HOSPITAL ENCOUNTER (OUTPATIENT)
Dept: CT IMAGING | Facility: HOSPITAL | Age: 32
Discharge: HOME | End: 2022-09-15
Attending: INTERNAL MEDICINE
Payer: COMMERCIAL

## 2022-09-15 DIAGNOSIS — R22.1 MASS OF RIGHT SIDE OF NECK: ICD-10-CM

## 2022-09-15 LAB
CREAT BLD-MCNC: 1.1 MG/DL
GFR SERPLBLD BASED ON 1.73 SQ M-ARVRAT: 91 ML/MIN/1.73M2 (ref 60–?)

## 2022-09-15 PROCEDURE — 82565 ASSAY OF CREATININE: CPT

## 2022-09-15 PROCEDURE — 70492 CT SFT TSUE NCK W/O & W/DYE: CPT | Performed by: INTERNAL MEDICINE

## 2022-09-15 RX ORDER — IOHEXOL 350 MG/ML
50 INJECTION, SOLUTION INTRAVENOUS
Status: COMPLETED | OUTPATIENT
Start: 2022-09-15 | End: 2022-09-15

## 2022-09-15 RX ADMIN — IOHEXOL 50 ML: 350 INJECTION, SOLUTION INTRAVENOUS at 14:10:00

## 2022-09-23 ENCOUNTER — OFFICE VISIT (OUTPATIENT)
Dept: OTOLARYNGOLOGY | Facility: CLINIC | Age: 32
End: 2022-09-23

## 2022-09-23 DIAGNOSIS — R22.1 NECK MASS: Primary | ICD-10-CM

## 2022-09-23 PROCEDURE — 99204 OFFICE O/P NEW MOD 45 MIN: CPT | Performed by: OTOLARYNGOLOGY

## 2022-09-29 ENCOUNTER — TELEPHONE (OUTPATIENT)
Dept: OTOLARYNGOLOGY | Facility: CLINIC | Age: 32
End: 2022-09-29

## 2022-09-29 DIAGNOSIS — R22.1 NECK MASS: Primary | ICD-10-CM

## 2022-09-29 RX ORDER — MULTIVIT-MIN/IRON FUM/FOLIC AC 7.5 MG-4
1 TABLET ORAL DAILY
COMMUNITY

## 2022-09-29 RX ORDER — METHYLPREDNISOLONE 4 MG/1
TABLET ORAL
Qty: 21 TABLET | Refills: 0 | Status: SHIPPED | OUTPATIENT
Start: 2022-09-29

## 2022-09-29 RX ORDER — AMOXICILLIN AND CLAVULANATE POTASSIUM 875; 125 MG/1; MG/1
1 TABLET, FILM COATED ORAL EVERY 12 HOURS
Qty: 20 TABLET | Refills: 0 | Status: SHIPPED | OUTPATIENT
Start: 2022-09-29

## 2022-09-29 NOTE — TELEPHONE ENCOUNTER
Patient is concern of discomfort and trouble moving neck. Per Dr. Luanne Hernandez will prescribe Antibiotics for 10 days and Medrol dose pack. Surgery has been moved up to 10/3/22.

## 2022-09-30 ENCOUNTER — LAB ENCOUNTER (OUTPATIENT)
Dept: LAB | Age: 32
End: 2022-09-30
Attending: OTOLARYNGOLOGY
Payer: COMMERCIAL

## 2022-09-30 DIAGNOSIS — Z01.818 PREOP TESTING: ICD-10-CM

## 2022-10-01 LAB — SARS-COV-2 RNA RESP QL NAA+PROBE: NOT DETECTED

## 2022-10-03 ENCOUNTER — HOSPITAL ENCOUNTER (OUTPATIENT)
Facility: HOSPITAL | Age: 32
Setting detail: HOSPITAL OUTPATIENT SURGERY
Discharge: HOME | End: 2022-10-03
Attending: OTOLARYNGOLOGY | Admitting: OTOLARYNGOLOGY
Payer: COMMERCIAL

## 2022-10-03 ENCOUNTER — ANESTHESIA (OUTPATIENT)
Dept: SURGERY | Facility: HOSPITAL | Age: 32
End: 2022-10-03
Payer: COMMERCIAL

## 2022-10-03 ENCOUNTER — ANESTHESIA EVENT (OUTPATIENT)
Dept: SURGERY | Facility: HOSPITAL | Age: 32
End: 2022-10-03
Payer: COMMERCIAL

## 2022-10-03 VITALS
RESPIRATION RATE: 16 BRPM | BODY MASS INDEX: 32.06 KG/M2 | OXYGEN SATURATION: 100 % | TEMPERATURE: 98 F | WEIGHT: 229 LBS | DIASTOLIC BLOOD PRESSURE: 83 MMHG | SYSTOLIC BLOOD PRESSURE: 147 MMHG | HEIGHT: 71 IN | HEART RATE: 96 BPM

## 2022-10-03 DIAGNOSIS — R22.1 NECK MASS: ICD-10-CM

## 2022-10-03 DIAGNOSIS — Z01.818 PREOP TESTING: Primary | ICD-10-CM

## 2022-10-03 PROCEDURE — 88307 TISSUE EXAM BY PATHOLOGIST: CPT | Performed by: OTOLARYNGOLOGY

## 2022-10-03 PROCEDURE — 0KB20ZZ EXCISION OF RIGHT NECK MUSCLE, OPEN APPROACH: ICD-10-PCS | Performed by: OTOLARYNGOLOGY

## 2022-10-03 PROCEDURE — 88305 TISSUE EXAM BY PATHOLOGIST: CPT | Performed by: OTOLARYNGOLOGY

## 2022-10-03 RX ORDER — METOCLOPRAMIDE 10 MG/1
10 TABLET ORAL ONCE
Status: COMPLETED | OUTPATIENT
Start: 2022-10-03 | End: 2022-10-03

## 2022-10-03 RX ORDER — MORPHINE SULFATE 10 MG/ML
6 INJECTION, SOLUTION INTRAMUSCULAR; INTRAVENOUS EVERY 10 MIN PRN
Status: DISCONTINUED | OUTPATIENT
Start: 2022-10-03 | End: 2022-10-03

## 2022-10-03 RX ORDER — DEXAMETHASONE SODIUM PHOSPHATE 4 MG/ML
VIAL (ML) INJECTION AS NEEDED
Status: DISCONTINUED | OUTPATIENT
Start: 2022-10-03 | End: 2022-10-03 | Stop reason: SURG

## 2022-10-03 RX ORDER — MORPHINE SULFATE 4 MG/ML
2 INJECTION, SOLUTION INTRAMUSCULAR; INTRAVENOUS EVERY 10 MIN PRN
Status: DISCONTINUED | OUTPATIENT
Start: 2022-10-03 | End: 2022-10-03

## 2022-10-03 RX ORDER — HYDROCODONE BITARTRATE AND ACETAMINOPHEN 7.5; 325 MG/1; MG/1
1 TABLET ORAL EVERY 6 HOURS PRN
Qty: 30 TABLET | Refills: 0 | Status: SHIPPED | OUTPATIENT
Start: 2022-10-03

## 2022-10-03 RX ORDER — ROCURONIUM BROMIDE 10 MG/ML
INJECTION, SOLUTION INTRAVENOUS AS NEEDED
Status: DISCONTINUED | OUTPATIENT
Start: 2022-10-03 | End: 2022-10-03 | Stop reason: SURG

## 2022-10-03 RX ORDER — SODIUM CHLORIDE 0.9 % (FLUSH) 0.9 %
10 SYRINGE (ML) INJECTION AS NEEDED
OUTPATIENT
Start: 2022-10-03

## 2022-10-03 RX ORDER — ACETAMINOPHEN 500 MG
1000 TABLET ORAL ONCE
Status: COMPLETED | OUTPATIENT
Start: 2022-10-03 | End: 2022-10-03

## 2022-10-03 RX ORDER — HYDROMORPHONE HYDROCHLORIDE 1 MG/ML
0.2 INJECTION, SOLUTION INTRAMUSCULAR; INTRAVENOUS; SUBCUTANEOUS EVERY 5 MIN PRN
Status: DISCONTINUED | OUTPATIENT
Start: 2022-10-03 | End: 2022-10-03

## 2022-10-03 RX ORDER — SODIUM CHLORIDE, SODIUM LACTATE, POTASSIUM CHLORIDE, CALCIUM CHLORIDE 600; 310; 30; 20 MG/100ML; MG/100ML; MG/100ML; MG/100ML
INJECTION, SOLUTION INTRAVENOUS CONTINUOUS
Status: DISCONTINUED | OUTPATIENT
Start: 2022-10-03 | End: 2022-10-03

## 2022-10-03 RX ORDER — ONDANSETRON 2 MG/ML
4 INJECTION INTRAMUSCULAR; INTRAVENOUS EVERY 6 HOURS PRN
OUTPATIENT
Start: 2022-10-03

## 2022-10-03 RX ORDER — HYDROMORPHONE HYDROCHLORIDE 1 MG/ML
0.4 INJECTION, SOLUTION INTRAMUSCULAR; INTRAVENOUS; SUBCUTANEOUS EVERY 5 MIN PRN
Status: DISCONTINUED | OUTPATIENT
Start: 2022-10-03 | End: 2022-10-03

## 2022-10-03 RX ORDER — NALOXONE HYDROCHLORIDE 0.4 MG/ML
80 INJECTION, SOLUTION INTRAMUSCULAR; INTRAVENOUS; SUBCUTANEOUS AS NEEDED
Status: DISCONTINUED | OUTPATIENT
Start: 2022-10-03 | End: 2022-10-03

## 2022-10-03 RX ORDER — HYDROMORPHONE HYDROCHLORIDE 1 MG/ML
0.6 INJECTION, SOLUTION INTRAMUSCULAR; INTRAVENOUS; SUBCUTANEOUS EVERY 5 MIN PRN
Status: DISCONTINUED | OUTPATIENT
Start: 2022-10-03 | End: 2022-10-03

## 2022-10-03 RX ORDER — MORPHINE SULFATE 4 MG/ML
4 INJECTION, SOLUTION INTRAMUSCULAR; INTRAVENOUS EVERY 10 MIN PRN
Status: DISCONTINUED | OUTPATIENT
Start: 2022-10-03 | End: 2022-10-03

## 2022-10-03 RX ORDER — CEFAZOLIN SODIUM/WATER 2 G/20 ML
SYRINGE (ML) INTRAVENOUS AS NEEDED
Status: DISCONTINUED | OUTPATIENT
Start: 2022-10-03 | End: 2022-10-03 | Stop reason: SURG

## 2022-10-03 RX ORDER — SODIUM CHLORIDE, SODIUM LACTATE, POTASSIUM CHLORIDE, CALCIUM CHLORIDE 600; 310; 30; 20 MG/100ML; MG/100ML; MG/100ML; MG/100ML
INJECTION, SOLUTION INTRAVENOUS CONTINUOUS
OUTPATIENT
Start: 2022-10-03

## 2022-10-03 RX ORDER — LIDOCAINE HYDROCHLORIDE 10 MG/ML
INJECTION, SOLUTION EPIDURAL; INFILTRATION; INTRACAUDAL; PERINEURAL AS NEEDED
Status: DISCONTINUED | OUTPATIENT
Start: 2022-10-03 | End: 2022-10-03 | Stop reason: SURG

## 2022-10-03 RX ORDER — LIDOCAINE HYDROCHLORIDE AND EPINEPHRINE 10; 10 MG/ML; UG/ML
INJECTION, SOLUTION INFILTRATION; PERINEURAL AS NEEDED
Status: DISCONTINUED | OUTPATIENT
Start: 2022-10-03 | End: 2022-10-03 | Stop reason: HOSPADM

## 2022-10-03 RX ORDER — ACETAMINOPHEN 325 MG/1
650 TABLET ORAL
OUTPATIENT
Start: 2022-10-03

## 2022-10-03 RX ORDER — ONDANSETRON 2 MG/ML
INJECTION INTRAMUSCULAR; INTRAVENOUS AS NEEDED
Status: DISCONTINUED | OUTPATIENT
Start: 2022-10-03 | End: 2022-10-03 | Stop reason: SURG

## 2022-10-03 RX ORDER — FAMOTIDINE 20 MG/1
20 TABLET, FILM COATED ORAL ONCE
Status: COMPLETED | OUTPATIENT
Start: 2022-10-03 | End: 2022-10-03

## 2022-10-03 RX ADMIN — SODIUM CHLORIDE, SODIUM LACTATE, POTASSIUM CHLORIDE, CALCIUM CHLORIDE: 600; 310; 30; 20 INJECTION, SOLUTION INTRAVENOUS at 10:40:00

## 2022-10-03 RX ADMIN — DEXAMETHASONE SODIUM PHOSPHATE 8 MG: 4 MG/ML VIAL (ML) INJECTION at 10:23:00

## 2022-10-03 RX ADMIN — CEFAZOLIN SODIUM/WATER 2 G: 2 G/20 ML SYRINGE (ML) INTRAVENOUS at 08:50:00

## 2022-10-03 RX ADMIN — ONDANSETRON 4 MG: 2 INJECTION INTRAMUSCULAR; INTRAVENOUS at 08:50:00

## 2022-10-03 RX ADMIN — LIDOCAINE HYDROCHLORIDE 50 MG: 10 INJECTION, SOLUTION EPIDURAL; INFILTRATION; INTRACAUDAL; PERINEURAL at 08:43:00

## 2022-10-03 RX ADMIN — ROCURONIUM BROMIDE 10 MG: 10 INJECTION, SOLUTION INTRAVENOUS at 08:43:00

## 2022-10-03 RX ADMIN — SODIUM CHLORIDE, SODIUM LACTATE, POTASSIUM CHLORIDE, CALCIUM CHLORIDE: 600; 310; 30; 20 INJECTION, SOLUTION INTRAVENOUS at 08:38:00

## 2022-10-03 NOTE — PLAN OF CARE
Patient states his tongue feels numb and tingling. Requested to assess. Noted right side of tongue swollen. No bite sharda noted. Pt denies any difficulty swallowing or shortness of breath. Dr. Shanique Weiss notified via phone call. No orders received.

## 2022-10-03 NOTE — INTERVAL H&P NOTE
Pre-op Diagnosis: Neck mass [R22.1]    The above referenced H&P was reviewed by Braxton Schwab. Yue Huitron MD on 10/3/2022, the patient was examined and no significant changes have occurred in the patient's condition since the H&P was performed. I discussed with the patient and/or legal representative the potential benefits, risks and side effects of this procedure; the likelihood of the patient achieving goals; and potential problems that might occur during recuperation. I discussed reasonable alternatives to the procedure, including risks, benefits and side effects related to the alternatives and risks related to not receiving this procedure. We will proceed with procedure as planned.

## 2022-10-03 NOTE — ANESTHESIA PROCEDURE NOTES
Airway  Date/Time: 10/3/2022 8:45 AM  Urgency: Elective    Airway not difficult    General Information and Staff    Patient location during procedure: OR  Anesthesiologist: Damaris Colón MD  Performed: anesthesiologist     Indications and Patient Condition  Indications for airway management: anesthesia  Spontaneous ventilation: present  Sedation level: deep  Preoxygenated: yes  Patient position: sniffing  Mask difficulty assessment: 1 - vent by mask    Final Airway Details  Final airway type: endotracheal airway      Successful airway: ETT  Cuffed: yes   Successful intubation technique: direct laryngoscopy  Endotracheal tube insertion site: oral  Blade: Jamey  Blade size: #4  ETT size (mm): 7.5    Cormack-Lehane Classification: grade IIA - partial view of glottis  Placement verified by: chest auscultation and capnometry   Measured from: teeth  ETT to teeth (cm): 24  Number of attempts at approach: 1

## 2022-10-03 NOTE — BRIEF OP NOTE
Pre-Operative Diagnosis: Neck mass [R22.1]     Post-Operative Diagnosis: Neck mass [R22.1]      Procedure Performed:   excision and closure of right neck mass Complicated    Surgeon(s) and Role:     * Emerita Aldana MD - Primary     * Phill Moeller MD - Assisting Surgeon       Estimated Blood Loss: 15 ml        Braxton Schwab.  Yue Huitron MD  10/3/2022  10:27 AM

## 2022-10-04 ENCOUNTER — TELEPHONE (OUTPATIENT)
Dept: OTOLARYNGOLOGY | Facility: CLINIC | Age: 32
End: 2022-10-04

## 2022-10-04 NOTE — TELEPHONE ENCOUNTER
Per pt doing well, post op day 1 right neck mass removal. Pt monitoring drain output , last night was at 11:30 pm was 35 ml , pt did not measure this morning. Advised that pt will all back with morning drainage. Pt taking post op medications as prescribed/ also advised pt no driving or showering until drain removed. Per pt drain output this morning was 10 ml and under, bright red in color. Advised pt per , would like pt to continue to monitor output , every 12 hr. Will call pt tomorrow morning. Pt verbalized understanding. Pt verbalized undrestanding.

## 2022-10-04 NOTE — OPERATIVE REPORT
[facility]    PATIENT'S NAME: NICOLASA MEJIA   ATTENDING PHYSICIAN: Royal Wang MD   OPERATING PHYSICIAN: Nieves Avery. Jennifer Wang MD   PATIENT ACCOUNT#:   [id number]    LOCATION:  [facility] 11 [facility] 10  MEDICAL RECORD #:   [id number]       YOB: 1990  ADMISSION DATE:       10/03/2022      OPERATION DATE:  10/03/2022    OPERATIVE REPORT    PREOPERATIVE DIAGNOSIS:  Right neck mass. POSTOPERATIVE DIAGNOSIS:  Right neck mass. PROCEDURE:  Excision of right neck mass complicated due to severe scarring. ASSISTANT SURGEON:  Nazario Cyr MD.    ESTIMATED BLOOD LOSS:  Less than 15 mL. ANESTHESIA:  General endotracheal anesthesia. INDICATIONS:  Patient presents with history of enlarging mass over the last month and a half with recent apparent infection requiring steroids and antibiotics to address the acute infectious process. Surgical excision of this recently inflamed mass is indicated. OPERATIVE TECHNIQUE:  Patient was taken to the operating room, placed in the supine position. Following the induction of general endotracheal anesthesia, the patient received Ancef and Decadron perioperatively. The head was turned to the left and a shoulder roll placed. The neck mass was readily identified and a natural skin line was identified, marked, and infiltrated with 1% Xylocaine with 1:100,000 of epinephrine. The patient was prepped and draped in regular fashion and we did perform intraoperative monitoring of the marginal mandibular nerve using SSEP. Wires and electrodes were placed appropriately for monitoring of this nerve branch. The incision was then placed through the skin and platysma allowing for elevation of inferiorly and superiorly based skin muscle flaps. The sternocleidomastoid was identified and dissected free from surrounding capsular tissue.   The mass was readily visualized and with significant difficulty, the capsule was dissected free from the surrounding fibrofatty tissues, muscular tissues. The carotid artery was identified deep to the neck after some work to dissect off the capsule medial to the mass. This was used as a landmark to dissect the inferior and medial aspect of the cystic structure from the surrounding tissues. Hemostat dissection, bipolar cautery was primarily used. The gland was then elevated off the jugular vein laterally and at no time did we note anything suspicious of the marginal mandibular branch. The gland was noted to abut the parotid gland inferiorly. It was carefully dissected free from these structures. 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  The sac and some overlying capsule was removed in its entirety and sent to Pathology for identification. This was sent in formalin. Any bleeding sites were controlled using bipolar cautery. Valsalva was performed up to 40 cm of water pressure several times with no significant bleeding noted. A 7 mm flat drain was placed within the neck, externalized through the skin posteriorly, and sutured to the skin using 2-0 silk. The tissues between the medial neck and the sternocleidomastoid were approximated and closed using 3-0 chromic gut suture. The platysma was approximated as well as was the subcutaneous tissues and the skin was then approximated, everted, and closed using a running subcuticular 4-0 Prolene suture. The wound was cleaned, and a series of Steri-Strips with benzoin were placed, followed by a large bulky dressing held in place by Ace bandage. The patient was subsequently awakened after the drain was placed to a DEIDRE suction bulb and taken to recovery room without any complications from the procedure. EBL was less than 15 mL for this complicated neck mass removal due to severe scarring circumferentially. Dictated By Jose Huff MD  d: 10/03/2022 10:32:51  t: 10/03/2022 18:04:05  [facility] [id number]  BRII/

## 2022-10-05 ENCOUNTER — NURSE ONLY (OUTPATIENT)
Dept: OTOLARYNGOLOGY | Facility: CLINIC | Age: 32
End: 2022-10-05
Payer: COMMERCIAL

## 2022-10-05 VITALS — SYSTOLIC BLOOD PRESSURE: 124 MMHG | DIASTOLIC BLOOD PRESSURE: 82 MMHG | HEART RATE: 88 BPM

## 2022-10-05 DIAGNOSIS — R22.1 NECK MASS: Primary | ICD-10-CM

## 2022-10-05 NOTE — TELEPHONE ENCOUNTER
Spoke with patient this morning. Continues to feel well. Has had some lower facial numbness since surgery, denies facial drooping or palsy. Mild swelling to bottom lip. Output last evening was 10 ml and 5 ml this am for a 15 ml out put in 24 hours. Patient scheduled for nurse visit today for drain removal.  Dr. Tere Couch.

## 2022-10-05 NOTE — TELEPHONE ENCOUNTER
Patient states he was told to call if he was going to be late.  PO  Appointment scheduled for 1 pm and he will be coming in at 2 pm. Please advise

## 2022-10-05 NOTE — PROGRESS NOTES
Patient presents to clinic for DEIDRE drain removal.  Identified using name and . Patient seated in exam chair. VSS as documented. Gauze over dressing removed and insertion site examined, clean, dry and intact. Suction removed from drain. Site cleaned with sterile saline. Suture removed, light pressure applied to surgical site. Drain removed without resistance. Scant serosanguinous drainage noted from site. Gauze placed over site. Surgical steri strips remain intact. Patient advised to change gauze as needed. Keep site clean and dry. Report any jacinto blood or symptoms of swelling, redness or pain. Keep follow up with Dr. Cyndee Camarena as scheduled tomorrow 10/06.     Quinton Andres RN

## 2022-10-06 ENCOUNTER — OFFICE VISIT (OUTPATIENT)
Dept: OTOLARYNGOLOGY | Facility: CLINIC | Age: 32
End: 2022-10-06
Payer: COMMERCIAL

## 2022-10-06 DIAGNOSIS — R22.1 NECK MASS: Primary | ICD-10-CM

## 2022-10-06 RX ORDER — METHYLPREDNISOLONE 4 MG/1
TABLET ORAL
Qty: 21 TABLET | Refills: 0 | Status: SHIPPED | OUTPATIENT
Start: 2022-10-06

## 2022-10-11 ENCOUNTER — OFFICE VISIT (OUTPATIENT)
Dept: OTOLARYNGOLOGY | Facility: CLINIC | Age: 32
End: 2022-10-11
Payer: COMMERCIAL

## 2022-10-11 VITALS — BODY MASS INDEX: 32.06 KG/M2 | WEIGHT: 229 LBS | HEIGHT: 71 IN

## 2022-10-11 DIAGNOSIS — R22.1 NECK MASS: Primary | ICD-10-CM

## 2022-10-11 PROCEDURE — 3008F BODY MASS INDEX DOCD: CPT | Performed by: OTOLARYNGOLOGY

## 2022-10-11 PROCEDURE — 99024 POSTOP FOLLOW-UP VISIT: CPT | Performed by: OTOLARYNGOLOGY

## 2023-05-24 ENCOUNTER — PATIENT MESSAGE (OUTPATIENT)
Dept: OTOLARYNGOLOGY | Facility: CLINIC | Age: 33
End: 2023-05-24

## 2023-06-05 ENCOUNTER — OFFICE VISIT (OUTPATIENT)
Dept: INTERNAL MEDICINE CLINIC | Facility: CLINIC | Age: 33
End: 2023-06-05

## 2023-06-05 ENCOUNTER — LAB ENCOUNTER (OUTPATIENT)
Dept: LAB | Age: 33
End: 2023-06-05
Attending: INTERNAL MEDICINE
Payer: COMMERCIAL

## 2023-06-05 VITALS
DIASTOLIC BLOOD PRESSURE: 85 MMHG | WEIGHT: 229 LBS | BODY MASS INDEX: 32.06 KG/M2 | HEART RATE: 82 BPM | SYSTOLIC BLOOD PRESSURE: 130 MMHG | HEIGHT: 71 IN

## 2023-06-05 DIAGNOSIS — K13.79 ORAL CAVITY PAIN: Primary | ICD-10-CM

## 2023-06-05 LAB — VIT B12 SERPL-MCNC: 410 PG/ML (ref 193–986)

## 2023-06-05 PROCEDURE — 82607 VITAMIN B-12: CPT | Performed by: INTERNAL MEDICINE

## 2023-06-05 PROCEDURE — 3079F DIAST BP 80-89 MM HG: CPT | Performed by: INTERNAL MEDICINE

## 2023-06-05 PROCEDURE — 99213 OFFICE O/P EST LOW 20 MIN: CPT | Performed by: INTERNAL MEDICINE

## 2023-06-05 PROCEDURE — 3075F SYST BP GE 130 - 139MM HG: CPT | Performed by: INTERNAL MEDICINE

## 2023-06-05 PROCEDURE — 36415 COLL VENOUS BLD VENIPUNCTURE: CPT | Performed by: INTERNAL MEDICINE

## 2023-06-05 PROCEDURE — 3008F BODY MASS INDEX DOCD: CPT | Performed by: INTERNAL MEDICINE

## 2023-06-05 NOTE — PATIENT INSTRUCTIONS
Await results of B12 level. Please schedule an appointment with Dr. Dave Mukherjee if not soon better.

## 2024-08-22 ENCOUNTER — OFFICE VISIT (OUTPATIENT)
Dept: INTERNAL MEDICINE CLINIC | Facility: CLINIC | Age: 34
End: 2024-08-22
Payer: COMMERCIAL

## 2024-08-22 VITALS
WEIGHT: 230 LBS | SYSTOLIC BLOOD PRESSURE: 112 MMHG | HEIGHT: 71 IN | BODY MASS INDEX: 32.2 KG/M2 | DIASTOLIC BLOOD PRESSURE: 71 MMHG | HEART RATE: 76 BPM

## 2024-08-22 DIAGNOSIS — T78.40XA ALLERGY, INITIAL ENCOUNTER: ICD-10-CM

## 2024-08-22 DIAGNOSIS — Z00.00 ANNUAL PHYSICAL EXAM: Primary | ICD-10-CM

## 2024-08-22 DIAGNOSIS — K21.9 GASTROESOPHAGEAL REFLUX DISEASE WITHOUT ESOPHAGITIS: ICD-10-CM

## 2024-08-22 PROCEDURE — 90471 IMMUNIZATION ADMIN: CPT | Performed by: NURSE PRACTITIONER

## 2024-08-22 PROCEDURE — 90715 TDAP VACCINE 7 YRS/> IM: CPT | Performed by: NURSE PRACTITIONER

## 2024-08-22 PROCEDURE — 99385 PREV VISIT NEW AGE 18-39: CPT | Performed by: NURSE PRACTITIONER

## 2024-08-22 NOTE — ASSESSMENT & PLAN NOTE
Normal exam.  Labs as ordered.  Skin check normal.  Hernial orifices intact.  No cervical, inguinal, axillary lymphadenopathy.    Immunizations- TDAP  Self testicular exam             I

## 2024-08-22 NOTE — PROGRESS NOTES
HPI:    Patient ID: Bashir Swain is a 33 year old male.    HPI Physical Exam  33 year old female is here for annual physical exam   He is here to discuss health maintenance issues.       After meals he has epigastric pain.  Tums-uses frequently.    Exercising-  Softball  Goes to the gym.      Does not vape  No smoking  Alcohol- 1 or 2 a night  No cannibals    Immunization History   Administered Date(s) Administered    Covid-19 Vaccine Pfizer 30 mcg/0.3 ml 04/15/2021, 05/06/2021, 12/02/2021    DTP 11/14/1988, 02/06/1989, 04/16/1989, 10/03/1990, 05/10/1994    HEP B 12/01/1998, 01/05/1999, 07/07/1999    HIB 04/11/1990    MMR 01/10/1990, 05/10/1994    Meningococcal (Menomune) 06/21/2007    Meningococcal-Menactra 06/21/2007    OPV 11/14/1988, 02/06/1989, 10/03/1990, 05/10/1994    TD 06/18/2003       Past Medical History:    Gastroesophageal reflux disease without esophagitis    Hypercholesterolemia    Visual impairment    contacts      Past Surgical History:   Procedure Laterality Date    Drain skin abscess complic Right 07/30/2019    thigh    Eye surgery Left     titanium plate behind eye for broken orbtal bone    Other  10/03/2022    Excision branchial cleft cyst      Social History     Socioeconomic History    Marital status:     Number of children: 1   Tobacco Use    Smoking status: Never    Smokeless tobacco: Never   Vaping Use    Vaping status: Never Used   Substance and Sexual Activity    Alcohol use: Yes     Alcohol/week: 12.0 - 13.0 standard drinks of alcohol     Types: 2 - 3 Glasses of wine, 10 Cans of beer per week     Comment: 10 bourbon, scotch    Drug use: Never          Review of Systems   Constitutional:  Negative for chills, fatigue and fever.   HENT:  Negative for congestion, dental problem, ear pain, hearing loss, postnasal drip, sinus pressure, sinus pain, sore throat and trouble swallowing.    Eyes:  Negative for pain and visual disturbance.   Respiratory:  Negative for cough, chest  tightness and shortness of breath.    Cardiovascular:  Negative for chest pain, palpitations and leg swelling.   Gastrointestinal:  Negative for abdominal pain, constipation, diarrhea, nausea and vomiting.   Endocrine: Negative for cold intolerance and heat intolerance.   Genitourinary:  Negative for dysuria and hematuria.   Musculoskeletal:  Negative for back pain and joint swelling.   Skin:  Negative for rash.   Allergic/Immunologic: Negative for environmental allergies.   Neurological:  Negative for weakness, numbness and headaches.   Hematological:  Does not bruise/bleed easily.   Psychiatric/Behavioral:  Negative for dysphoric mood and sleep disturbance. The patient is not nervous/anxious.               No current outpatient medications on file.     Allergies:No Known Allergies   PHYSICAL EXAM:   Physical Exam  Constitutional:       Appearance: Normal appearance. He is well-developed.   HENT:      Head: Normocephalic.      Right Ear: Tympanic membrane normal.      Left Ear: Tympanic membrane normal.      Nose: Nose normal.      Mouth/Throat:      Mouth: Mucous membranes are moist.      Pharynx: No oropharyngeal exudate or posterior oropharyngeal erythema.   Eyes:      General:         Right eye: No discharge.         Left eye: No discharge.      Pupils: Pupils are equal, round, and reactive to light.   Cardiovascular:      Rate and Rhythm: Normal rate and regular rhythm.      Heart sounds: Normal heart sounds. No murmur heard.     No friction rub. No gallop.   Pulmonary:      Effort: Pulmonary effort is normal. No respiratory distress.      Breath sounds: Normal breath sounds. No wheezing, rhonchi or rales.   Abdominal:      General: Bowel sounds are normal. There is no distension.      Palpations: Abdomen is soft. There is no mass.      Tenderness: There is no abdominal tenderness. There is no right CVA tenderness, left CVA tenderness or guarding.   Genitourinary:     Penis: Normal.       Testes: Normal.    Musculoskeletal:         General: No tenderness.      Cervical back: Normal range of motion and neck supple. No tenderness.      Right lower leg: No edema.      Left lower leg: No edema.   Lymphadenopathy:      Cervical: No cervical adenopathy.   Skin:     General: Skin is warm and dry.      Findings: No rash.   Neurological:      Mental Status: He is alert and oriented to person, place, and time.      Coordination: Coordination normal.      Gait: Gait normal.   Psychiatric:         Mood and Affect: Mood normal.         Behavior: Behavior normal.         Thought Content: Thought content normal.         Judgment: Judgment normal.       /71 (BP Location: Left arm, Patient Position: Sitting, Cuff Size: large)   Pulse 76   Ht 5' 11\" (1.803 m)   Wt 230 lb (104.3 kg)   BMI 32.08 kg/m²   Wt Readings from Last 2 Encounters:   08/22/24 230 lb (104.3 kg)   06/05/23 229 lb (103.9 kg)     Body mass index is 32.08 kg/m².(2)  Lab Results   Component Value Date    WBC 7.1 06/16/2021    RBC 5.62 06/16/2021    HGB 16.5 06/16/2021    HCT 48.1 06/16/2021    MCV 85.6 06/16/2021    MCH 29.4 06/16/2021    MCHC 34.3 06/16/2021    RDW 12.1 06/16/2021    .0 06/16/2021    MPV 9.0 05/11/2013      Lab Results   Component Value Date    GLU 84 06/16/2021    BUN 18 06/16/2021    BUNCREA 14.3 06/16/2021    CREATSERUM 1.26 06/16/2021    ANIONGAP 7 06/16/2021    GFRNAA 75 06/16/2021    GFRAA 87 06/16/2021    CA 10.0 06/16/2021    OSMOCALC 287 06/16/2021    ALKPHO 54 06/16/2021    AST 22 06/16/2021    ALT 44 06/16/2021    ALKPHOS 51 05/11/2013    BILT 0.6 06/16/2021    TP 7.7 06/16/2021    ALB 4.1 06/16/2021    GLOBULIN 3.6 06/16/2021    AGRATIO 1.6 05/11/2013     06/16/2021    K 4.2 06/16/2021     06/16/2021    CO2 27.0 06/16/2021      No results found for: \"EAG\", \"A1C\"   Lab Results   Component Value Date    CHOLEST 213 (H) 06/16/2021    TRIG 150 (H) 06/16/2021    HDL 46 06/16/2021     (H) 06/16/2021    VLDL 28  06/16/2021    NONHDLC 167 (H) 06/16/2021    CALCNONHDL 103 05/11/2013      Lab Results   Component Value Date    TSH 1.310 06/16/2021                ASSESSMENT/PLAN:     Problem List Items Addressed This Visit       Gastroesophageal reflux disease without esophagitis     GERD symptoms    Plan  Reduce alcohol  Omeprazole daily for two weeks  Drink water with lemon  Annual labs   Food allergy panel         Annual physical exam - Primary     Normal exam.  Labs as ordered.  Skin check normal.  Hernial orifices intact.  No cervical, inguinal, axillary lymphadenopathy.    Immunizations- TDAP  Self testicular exam             I          Relevant Orders    Comp Metabolic Panel (14)    Lipid Panel    TSH W Reflex To Free T4    Vitamin D, 25-Hydroxy    Vitamin B12    CBC, NO DIFFERENTIAL/PLATELET    Adult Food Allergy Prof [E]     Other Visit Diagnoses       Allergy, initial encounter        Relevant Orders    Adult Food Allergy Prof [E]             Orders Placed This Encounter   Procedures    Comp Metabolic Panel (14)    Lipid Panel    TSH W Reflex To Free T4    Vitamin D, 25-Hydroxy    Vitamin B12    CBC, NO DIFFERENTIAL/PLATELET    Adult Food Allergy Prof [E]       Meds This Visit:  Requested Prescriptions      No prescriptions requested or ordered in this encounter       Imaging & Referrals:  None         KAREN Oneil

## 2024-08-22 NOTE — ASSESSMENT & PLAN NOTE
GERD symptoms    Plan  Reduce alcohol  Omeprazole daily for two weeks  Drink water with lemon  Annual labs   Food allergy panel

## (undated) DEVICE — SUT SILK 2-0 SA65H

## (undated) DEVICE — HEAD & NECK: Brand: MEDLINE INDUSTRIES, INC.

## (undated) DEVICE — SUCTION CANISTER, 3000CC,SAFELINER: Brand: DEROYAL

## (undated) DEVICE — SUT SILK 2-0 FS 685G

## (undated) DEVICE — SOLUTION  .9 1000ML BTL

## (undated) DEVICE — EVACUATOR URO RELIVAC 100CC

## (undated) DEVICE — DRAIN INCS 7MM 20CMX7MM SIL

## (undated) DEVICE — NECK ACCESSORY: Brand: MEDLINE INDUSTRIES, INC.

## (undated) DEVICE — APPLICATOR CHLORAPREP 26ML

## (undated) DEVICE — ENCORE® LATEX ACCLAIM SIZE 8, STERILE LATEX POWDER-FREE SURGICAL GLOVE: Brand: ENCORE

## (undated) DEVICE — SUT PROLENE 4-0 FS-2 8683G

## (undated) DEVICE — INTENDED FOR TISSUE SEPARATION, AND OTHER PROCEDURES THAT REQUIRE A SHARP SURGICAL BLADE TO PUNCTURE OR CUT.: Brand: BARD-PARKER ® STAINLESS STEEL BLADES

## (undated) DEVICE — SUT CHROMIC GUT 3-0 SH G122H

## (undated) NOTE — LETTER
No referring provider defined for this encounter. 09/23/22        Patient: Jordan Bravo   YOB: 1990   Date of Visit: 9/23/2022       Dear  Dr. Lorri Rehman MD,      Thank you for referring Jordan Bravo to my practice. Please find my assessment and plan below. ASSESSMENT AND PLAN    1. Neck mass  4 cm neck mass in the anterior neck/submandibular region. Branchial cleft cyst?  At this time I did simply recommend that this be removed under general anesthesia with monitoring of the facial nerve intraoperatively. He understands the risks of surgery to include but not be limited to postoperative pain, bleeding, anesthesia used as well as injury to any nerves and vessels in that area. He specifically understands that if malignancy is found that he may require further treatment in the future. He accepts these risks and wishes to proceed               Sincerely,   Cephas Galeazzi D. Montie Casa, MD   [address] [place] [address] [address] [id number]    Document electronically generated by:  [facility].  James Pedraza MD